# Patient Record
Sex: FEMALE | Race: WHITE | NOT HISPANIC OR LATINO | Employment: FULL TIME | URBAN - METROPOLITAN AREA
[De-identification: names, ages, dates, MRNs, and addresses within clinical notes are randomized per-mention and may not be internally consistent; named-entity substitution may affect disease eponyms.]

---

## 2024-05-02 ENCOUNTER — HOSPITAL ENCOUNTER (OUTPATIENT)
Dept: RADIOLOGY | Facility: MEDICAL CENTER | Age: 44
End: 2024-05-02
Attending: NURSE PRACTITIONER
Payer: COMMERCIAL

## 2024-05-02 ENCOUNTER — OFFICE VISIT (OUTPATIENT)
Dept: URGENT CARE | Facility: CLINIC | Age: 44
End: 2024-05-02
Payer: COMMERCIAL

## 2024-05-02 VITALS
DIASTOLIC BLOOD PRESSURE: 62 MMHG | BODY MASS INDEX: 21.18 KG/M2 | RESPIRATION RATE: 16 BRPM | HEIGHT: 69 IN | TEMPERATURE: 97.1 F | OXYGEN SATURATION: 99 % | HEART RATE: 82 BPM | WEIGHT: 143 LBS | SYSTOLIC BLOOD PRESSURE: 108 MMHG

## 2024-05-02 DIAGNOSIS — I80.02 THROMBOPHLEBITIS OF SUPERFICIAL VEINS OF LEFT LOWER EXTREMITY: ICD-10-CM

## 2024-05-02 DIAGNOSIS — M79.89 LEFT LEG SWELLING: ICD-10-CM

## 2024-05-02 DIAGNOSIS — S49.92XA INJURY OF LEFT SHOULDER, INITIAL ENCOUNTER: ICD-10-CM

## 2024-05-02 PROCEDURE — 3074F SYST BP LT 130 MM HG: CPT | Performed by: NURSE PRACTITIONER

## 2024-05-02 PROCEDURE — 3078F DIAST BP <80 MM HG: CPT | Performed by: NURSE PRACTITIONER

## 2024-05-02 PROCEDURE — 99203 OFFICE O/P NEW LOW 30 MIN: CPT | Performed by: NURSE PRACTITIONER

## 2024-05-02 NOTE — PROGRESS NOTES
Chief Complaint   Patient presents with    Shoulder Injury     Shoulder/ rotator cuff, needs referal for MRI     Other     PT states veins on her leg have turned into a bruise, wants to make sure there is no blood clots        HISTORY OF PRESENT ILLNESS: Patient is a pleasant 43 y.o. female who presents to urgent care today with complaints of left shoulder pain and left lower extremity pain.  Patient notes she accidentally injured her shoulder approximately 6 months ago.  She has had pain to her left shoulder since.  Pain exacerbated with range of motion.  She denies any numbness or tingling.  Denies previous injuries to the shoulder.  She has tried topical cream for symptom relief.  She is requesting a referral to follow-up with orthopedics.  She also notes history of varicose veins and over the last week has had some tenderness and swelling to varicose veins on left lower extremity.  She denies history of DVTs.      Patient Active Problem List    Diagnosis Date Noted    Appendicitis with perforation 09/23/2015       Allergies:Patient has no known allergies.    Current Outpatient Medications Ordered in Epic   Medication Sig Dispense Refill    POTASSIUM PO Take 1 Tab by mouth every bedtime. OTC      MAGNESIUM PO Take 1 Tab by mouth every bedtime. OTC      TURMERIC PO Take 1 Tab by mouth every day.      ibuprofen (MOTRIN) 200 MG Tab Take 600 mg by mouth every 6 hours as needed.       No current Epic-ordered facility-administered medications on file.       History reviewed. No pertinent past medical history.    Social History     Tobacco Use    Smoking status: Never   Substance Use Topics    Alcohol use: No    Drug use: No       No family status information on file.   History reviewed. No pertinent family history.    ROS:  Review of Systems   Constitutional: Negative for fever, chills, weight loss, malaise, and fatigue.   HENT: Negative for ear pain, nosebleeds, congestion, sore throat and neck pain.    Eyes: Negative  "for vision changes.   Neuro: Negative for headache, sensory changes, weakness, seizure, LOC.   Cardiovascular: Negative for chest pain, palpitations, orthopnea and leg swelling.   Respiratory: Negative for cough, sputum production, shortness of breath and wheezing.   Gastrointestinal: Negative for abdominal pain, nausea, vomiting or diarrhea.   Genitourinary: Negative for dysuria, urgency and frequency.  Musculoskeletal: Positive for left lower extremity pain and swelling.  Positive for left shoulder pain.  Negative for falls, neck pain, back pain, myalgias.   Skin: Negative for rash, diaphoresis.     Exam:  /62   Pulse 82   Temp 36.2 °C (97.1 °F)   Resp 16   Ht 1.753 m (5' 9\")   Wt 64.9 kg (143 lb)   SpO2 99%   General: well-nourished, well-developed female in NAD  Head: normocephalic, atraumatic  Eyes: PERRLA, no conjunctival injection, acuity grossly intact, lids normal.  Ears: normal shape and symmetry, no tenderness, no discharge. External canals are without any significant edema or erythema. Tympanic membranes are without any inflammation, no effusion. Gross auditory acuity is intact.  Nose: symmetrical without tenderness, no discharge.  Mouth/Throat: reasonable hygiene, no erythema, exudates or tonsillar enlargement.  Neck: no masses, range of motion within normal limits, no tracheal deviation. No obvious thyroid enlargement.   Lymph: no cervical adenopathy. No supraclavicular adenopathy.   Neuro: alert and oriented. Cranial nerves 1-12 grossly intact. No sensory deficit.   Cardiovascular: regular rate and rhythm. No edema.  Pulmonary: no distress. Chest is symmetrical with respiration, no wheezes, crackles, or rhonchi.   Musculoskeletal: no clubbing, appropriate muscle tone, gait is stable.  Left shoulder: Normal appearance, nontender, full range of motion.  Left lower extremity: There is tenderness and swelling noted to medial left knee.  Varicose veins noted to bilateral lower " extremities.  Skin: warm, dry, intact, no clubbing, no cyanosis, no rashes.   Psych: appropriate mood, affect, judgement.         Assessment/Plan:  1. Left leg swelling  US-EXTREMITY VENOUS LOWER UNILAT LEFT      2. Injury of left shoulder, initial encounter  Referral to Orthopedics          The patient is a pleasant 43-year-old female who presents with left shoulder injury approximately 6 months ago.  I placed a referral for her to follow-up with orthopedics.  She may try OTC NSAIDs for symptom relief.  Regarding left leg pain and swelling, ultrasound ordered, will follow-up accordingly.  Supportive care, differential diagnoses, and indications for immediate follow-up discussed with patient.   Pathogenesis of diagnosis discussed including typical length and natural progression.   Instructed to return to clinic or nearest emergency department for any change in condition, further concerns, or worsening of symptoms.  Patient states understanding of the plan of care and discharge instructions.  Instructed to make an appointment, for follow up, with her primary care provider.        Please note that this dictation was created using voice recognition software. I have made every reasonable attempt to correct obvious errors, but I expect that there are errors of grammar and possibly content that I did not discover before finalizing the note.      NATY Yang.

## 2024-05-08 ENCOUNTER — TELEPHONE (OUTPATIENT)
Dept: VASCULAR LAB | Facility: MEDICAL CENTER | Age: 44
End: 2024-05-08
Payer: COMMERCIAL

## 2024-05-08 NOTE — TELEPHONE ENCOUNTER
[facility] Heart and Vascular Health and Pharmacotherapy Programs     Received anticoagulation referral from TOÑO Wu on 5/2/24.     Called patient to schedule an appt to establish care. No answer. Unable to LVM.    1st call     Insurance: [facility]  PCP: None  Locations to be seen: [facility]     If no response by 6/2/24 (1 month from referral date) OR 2 no shows/cancellations, will remove from referral list and send FYI to referring provider    [facility]armacotherapy [facility] at [phone], fax [phone]    Servando Yu, PharmD, BCACP

## 2024-05-09 ENCOUNTER — ANTICOAGULATION VISIT (OUTPATIENT)
Dept: VASCULAR LAB | Facility: MEDICAL CENTER | Age: 44
End: 2024-05-09
Attending: INTERNAL MEDICINE
Payer: COMMERCIAL

## 2024-05-09 VITALS — DIASTOLIC BLOOD PRESSURE: 51 MMHG | SYSTOLIC BLOOD PRESSURE: 96 MMHG | HEART RATE: 85 BPM

## 2024-05-09 DIAGNOSIS — I80.02 THROMBOPHLEBITIS OF SUPERFICIAL VEINS OF LEFT LOWER EXTREMITY: ICD-10-CM

## 2024-05-09 PROBLEM — I80.00 SUPERFICIAL THROMBOPHLEBITIS OF LOWER EXTREMITY: Status: ACTIVE | Noted: 2024-05-09

## 2024-05-09 PROCEDURE — 3078F DIAST BP <80 MM HG: CPT | Performed by: NURSE PRACTITIONER

## 2024-05-09 PROCEDURE — 99211 OFF/OP EST MAY X REQ PHY/QHP: CPT | Performed by: NURSE PRACTITIONER

## 2024-05-09 PROCEDURE — 3074F SYST BP LT 130 MM HG: CPT | Performed by: NURSE PRACTITIONER

## 2024-05-09 NOTE — PROGRESS NOTES
NEW DOAC   .  Anticoagulation Patient Findings      PCP: Pcp Pt States None  Cardiologist: none  Dx: LLE SVT  CHADSVASC = n/a  HAS-BLED = 0  Target End Date = 6 weeks then re-eval    Pt Hx: Pt seen at  5/2/24 w/ left shoulder pain and LLE pain. She has a hx of varicose veins and noted more pain and swelling the week prior.     U/s showed:    A varicose vein at the knee is dilated, thrombosed & filled with material    of soft acoustic density consistent with acute superficial    thrombophlebitis.  No obious extension into the greater saphenous vein.    Denies any prior hx of blood clots. No known FH. She does not use tobacco. Has never been pregnant and does not take hormones. She was started on Xarelto 10 mg daily. Taking once daily as instructed. No problems with bleeding.    Labs:  Lab Results   Component Value Date/Time    WBC 14.6 (H) 09/23/2015 12:37 PM    RBC 4.49 09/23/2015 12:37 PM    HEMOGLOBIN 14.3 09/23/2015 12:37 PM    HEMATOCRIT 42.6 09/23/2015 12:37 PM    MCV 94.9 09/23/2015 12:37 PM    MCH 31.8 09/23/2015 12:37 PM    MCHC 33.6 09/23/2015 12:37 PM    MPV 10.2 09/23/2015 12:37 PM    NEUTSPOLYS 58.40 09/23/2015 12:37 PM    LYMPHOCYTES 10.60 (L) 09/23/2015 12:37 PM    MONOCYTES 3.50 09/23/2015 12:37 PM    EOSINOPHILS 0.90 09/23/2015 12:37 PM    BASOPHILS 0.90 09/23/2015 12:37 PM    HYPOCHROMIA 1+ 06/14/2014 05:20 PM    ANISOCYTOSIS 1+ 09/23/2015 12:37 PM      Lab Results   Component Value Date/Time    SODIUM 136 09/23/2015 12:37 PM    POTASSIUM 3.2 (L) 09/23/2015 12:37 PM    CHLORIDE 103 09/23/2015 12:37 PM    CO2 23 09/23/2015 12:37 PM    GLUCOSE 120 (H) 09/23/2015 12:37 PM    BUN 22 09/23/2015 12:37 PM    CREATININE 0.91 09/23/2015 12:37 PM        Pt is new to rivaroxaban (Xarelto) and new to RCC. Discussed:   Indication for DOAC therapy.  Importance of monitoring and compliance.   Monitoring parameters, signs and symptoms of bleeding or clotting.  DOAC therapy, side effects, potential DDIs, OTC  medications  Hormonal therapy: No  Pregnancy: No  DDI: No  Pt is not on antiplatelet/NSAID therapy  Lifestyle safety, ie smoking, ETOH, hobby safety, fall safety/prevention  Procedures for missed doses or suspected missed doses, surgeries/procedures, travel, dental work, any medication changes    Continue taking Xarelto 10 mg by mouth daily    DOAC affordable: yes; copay ~$40 per month    Samples provided: no;     Labs to be completed prior to next f/u - CBC, CMP  Referral for PCP placed.     F/U:  5 week(s)    DELORIS Zamorano.    Added [facility] to care team   Send to [facility]

## 2024-05-28 ENCOUNTER — TELEPHONE (OUTPATIENT)
Dept: VASCULAR LAB | Facility: MEDICAL CENTER | Age: 44
End: 2024-05-28
Payer: COMMERCIAL

## 2024-05-28 DIAGNOSIS — I80.02 THROMBOPHLEBITIS OF SUPERFICIAL VEINS OF LEFT LOWER EXTREMITY: ICD-10-CM

## 2024-05-28 NOTE — TELEPHONE ENCOUNTER
Called and spoke to Pt today in regards to setting up her refill with [facility]. Per pt, she states that she would like to get her medication to be filled through her current pharmacy due to convenience on her part. Provided my contact information and my co-Rx coordinator if in case she has questions and concerns.     Verified and confirmed with pt that she haven't enrolled with copay card for the Xarelto. Provided her the website to enroll with and follow the prompts to generate a copay card. Pt verbally understood and thanked me for the help.     Will release Rx to pharmacy on file: [id number]--[address], [address], [place] [address]      MARINA Brush, PhT  Vascular Pharmacy Liaison (Rx Coordinator)  P: [phone]  5/28/2024 3:50 PM

## 2024-05-28 NOTE — TELEPHONE ENCOUNTER
Received Refill PA request via [employer]  for XARELTO 10MG TABLETS. (Quantity:90, Day Supply:90)     Insurance: [employer]  Member ID:  [id number]  BIN: [id number]  PCN: [facility]  Group: [id number]     Ran Test claim via [place] & medication Pays for a $120/90DS ; $40/30DS copay. Will outreach to patient to offer specialty pharmacy services and or release to preferred pharmacy    MARINA Brush, PhT  Vascular Pharmacy Liaison (Rx Coordinator)  P: [phone]  5/28/2024 3:26 PM

## 2024-06-04 SDOH — ECONOMIC STABILITY: FOOD INSECURITY: WITHIN THE PAST 12 MONTHS, YOU WORRIED THAT YOUR FOOD WOULD RUN OUT BEFORE YOU GOT MONEY TO BUY MORE.: NEVER TRUE

## 2024-06-04 SDOH — ECONOMIC STABILITY: TRANSPORTATION INSECURITY
IN THE PAST 12 MONTHS, HAS THE LACK OF TRANSPORTATION KEPT YOU FROM MEDICAL APPOINTMENTS OR FROM GETTING MEDICATIONS?: NO

## 2024-06-04 SDOH — ECONOMIC STABILITY: HOUSING INSECURITY
IN THE LAST 12 MONTHS, WAS THERE A TIME WHEN YOU DID NOT HAVE A STEADY PLACE TO SLEEP OR SLEPT IN A SHELTER (INCLUDING NOW)?: NO

## 2024-06-04 SDOH — ECONOMIC STABILITY: INCOME INSECURITY: HOW HARD IS IT FOR YOU TO PAY FOR THE VERY BASICS LIKE FOOD, HOUSING, MEDICAL CARE, AND HEATING?: NOT VERY HARD

## 2024-06-04 SDOH — ECONOMIC STABILITY: HOUSING INSECURITY: IN THE LAST 12 MONTHS, HOW MANY PLACES HAVE YOU LIVED?: 1

## 2024-06-04 SDOH — ECONOMIC STABILITY: FOOD INSECURITY: WITHIN THE PAST 12 MONTHS, THE FOOD YOU BOUGHT JUST DIDN'T LAST AND YOU DIDN'T HAVE MONEY TO GET MORE.: NEVER TRUE

## 2024-06-04 SDOH — ECONOMIC STABILITY: TRANSPORTATION INSECURITY
IN THE PAST 12 MONTHS, HAS LACK OF RELIABLE TRANSPORTATION KEPT YOU FROM MEDICAL APPOINTMENTS, MEETINGS, WORK OR FROM GETTING THINGS NEEDED FOR DAILY LIVING?: NO

## 2024-06-04 SDOH — HEALTH STABILITY: PHYSICAL HEALTH: ON AVERAGE, HOW MANY DAYS PER WEEK DO YOU ENGAGE IN MODERATE TO STRENUOUS EXERCISE (LIKE A BRISK WALK)?: 7 DAYS

## 2024-06-04 SDOH — ECONOMIC STABILITY: TRANSPORTATION INSECURITY
IN THE PAST 12 MONTHS, HAS LACK OF TRANSPORTATION KEPT YOU FROM MEETINGS, WORK, OR FROM GETTING THINGS NEEDED FOR DAILY LIVING?: NO

## 2024-06-04 SDOH — ECONOMIC STABILITY: INCOME INSECURITY: IN THE LAST 12 MONTHS, WAS THERE A TIME WHEN YOU WERE NOT ABLE TO PAY THE MORTGAGE OR RENT ON TIME?: NO

## 2024-06-04 SDOH — HEALTH STABILITY: MENTAL HEALTH
STRESS IS WHEN SOMEONE FEELS TENSE, NERVOUS, ANXIOUS, OR CAN'T SLEEP AT NIGHT BECAUSE THEIR MIND IS TROUBLED. HOW STRESSED ARE YOU?: RATHER MUCH

## 2024-06-04 SDOH — HEALTH STABILITY: PHYSICAL HEALTH: ON AVERAGE, HOW MANY MINUTES DO YOU ENGAGE IN EXERCISE AT THIS LEVEL?: 30 MIN

## 2024-06-04 ASSESSMENT — SOCIAL DETERMINANTS OF HEALTH (SDOH)
HOW OFTEN DO YOU GET TOGETHER WITH FRIENDS OR RELATIVES?: ONCE A WEEK
DO YOU BELONG TO ANY CLUBS OR ORGANIZATIONS SUCH AS CHURCH GROUPS UNIONS, FRATERNAL OR ATHLETIC GROUPS, OR SCHOOL GROUPS?: NO
IN A TYPICAL WEEK, HOW MANY TIMES DO YOU TALK ON THE PHONE WITH FAMILY, FRIENDS, OR NEIGHBORS?: MORE THAN THREE TIMES A WEEK
HOW OFTEN DO YOU ATTENT MEETINGS OF THE CLUB OR ORGANIZATION YOU BELONG TO?: NEVER
HOW OFTEN DO YOU HAVE A DRINK CONTAINING ALCOHOL: MONTHLY OR LESS
HOW OFTEN DO YOU HAVE SIX OR MORE DRINKS ON ONE OCCASION: NEVER
WITHIN THE PAST 12 MONTHS, YOU WORRIED THAT YOUR FOOD WOULD RUN OUT BEFORE YOU GOT THE MONEY TO BUY MORE: NEVER TRUE
HOW HARD IS IT FOR YOU TO PAY FOR THE VERY BASICS LIKE FOOD, HOUSING, MEDICAL CARE, AND HEATING?: NOT VERY HARD
HOW MANY DRINKS CONTAINING ALCOHOL DO YOU HAVE ON A TYPICAL DAY WHEN YOU ARE DRINKING: 1 OR 2
HOW OFTEN DO YOU ATTEND CHURCH OR RELIGIOUS SERVICES?: NEVER
HOW OFTEN DO YOU GET TOGETHER WITH FRIENDS OR RELATIVES?: ONCE A WEEK
HOW OFTEN DO YOU ATTENT MEETINGS OF THE CLUB OR ORGANIZATION YOU BELONG TO?: NEVER
DO YOU BELONG TO ANY CLUBS OR ORGANIZATIONS SUCH AS CHURCH GROUPS UNIONS, FRATERNAL OR ATHLETIC GROUPS, OR SCHOOL GROUPS?: NO
HOW OFTEN DO YOU ATTEND CHURCH OR RELIGIOUS SERVICES?: NEVER
IN A TYPICAL WEEK, HOW MANY TIMES DO YOU TALK ON THE PHONE WITH FAMILY, FRIENDS, OR NEIGHBORS?: MORE THAN THREE TIMES A WEEK

## 2024-06-04 ASSESSMENT — LIFESTYLE VARIABLES
HOW OFTEN DO YOU HAVE A DRINK CONTAINING ALCOHOL: MONTHLY OR LESS
SKIP TO QUESTIONS 9-10: 1
HOW OFTEN DO YOU HAVE SIX OR MORE DRINKS ON ONE OCCASION: NEVER
AUDIT-C TOTAL SCORE: 1
HOW MANY STANDARD DRINKS CONTAINING ALCOHOL DO YOU HAVE ON A TYPICAL DAY: 1 OR 2

## 2024-06-07 ENCOUNTER — OFFICE VISIT (OUTPATIENT)
Dept: MEDICAL GROUP | Facility: PHYSICIAN GROUP | Age: 44
End: 2024-06-07
Payer: COMMERCIAL

## 2024-06-07 VITALS
HEIGHT: 69 IN | DIASTOLIC BLOOD PRESSURE: 70 MMHG | WEIGHT: 142 LBS | BODY MASS INDEX: 21.03 KG/M2 | SYSTOLIC BLOOD PRESSURE: 116 MMHG | HEART RATE: 82 BPM | TEMPERATURE: 98 F | OXYGEN SATURATION: 98 %

## 2024-06-07 DIAGNOSIS — Z11.4 SCREENING FOR HIV (HUMAN IMMUNODEFICIENCY VIRUS): ICD-10-CM

## 2024-06-07 DIAGNOSIS — Z11.59 NEED FOR HEPATITIS C SCREENING TEST: ICD-10-CM

## 2024-06-07 DIAGNOSIS — Z12.11 ENCOUNTER FOR SCREENING FOR COLORECTAL MALIGNANT NEOPLASM: ICD-10-CM

## 2024-06-07 DIAGNOSIS — M25.512 CHRONIC LEFT SHOULDER PAIN: ICD-10-CM

## 2024-06-07 DIAGNOSIS — Z23 NEED FOR VACCINATION: ICD-10-CM

## 2024-06-07 DIAGNOSIS — Z00.00 PREVENTATIVE HEALTH CARE: ICD-10-CM

## 2024-06-07 DIAGNOSIS — G89.29 CHRONIC LEFT SHOULDER PAIN: ICD-10-CM

## 2024-06-07 DIAGNOSIS — Z12.12 ENCOUNTER FOR SCREENING FOR COLORECTAL MALIGNANT NEOPLASM: ICD-10-CM

## 2024-06-07 PROBLEM — I83.93 ASYMPTOMATIC VARICOSE VEINS OF LOWER EXTREMITY WITHOUT COMPLICATION, BILATERAL: Status: ACTIVE | Noted: 2024-06-07

## 2024-06-07 PROBLEM — G25.81 RESTLESS LEG SYNDROME: Status: ACTIVE | Noted: 2024-06-07

## 2024-06-07 PROCEDURE — 3074F SYST BP LT 130 MM HG: CPT | Performed by: STUDENT IN AN ORGANIZED HEALTH CARE EDUCATION/TRAINING PROGRAM

## 2024-06-07 PROCEDURE — 90715 TDAP VACCINE 7 YRS/> IM: CPT | Performed by: STUDENT IN AN ORGANIZED HEALTH CARE EDUCATION/TRAINING PROGRAM

## 2024-06-07 PROCEDURE — 90471 IMMUNIZATION ADMIN: CPT | Performed by: STUDENT IN AN ORGANIZED HEALTH CARE EDUCATION/TRAINING PROGRAM

## 2024-06-07 PROCEDURE — 3078F DIAST BP <80 MM HG: CPT | Performed by: STUDENT IN AN ORGANIZED HEALTH CARE EDUCATION/TRAINING PROGRAM

## 2024-06-07 PROCEDURE — 99213 OFFICE O/P EST LOW 20 MIN: CPT | Mod: 25 | Performed by: STUDENT IN AN ORGANIZED HEALTH CARE EDUCATION/TRAINING PROGRAM

## 2024-06-07 ASSESSMENT — ENCOUNTER SYMPTOMS
SHORTNESS OF BREATH: 0
FEVER: 0
HEADACHES: 0
DIZZINESS: 0
CHILLS: 0

## 2024-06-07 ASSESSMENT — PATIENT HEALTH QUESTIONNAIRE - PHQ9: CLINICAL INTERPRETATION OF PHQ2 SCORE: 0

## 2024-06-07 NOTE — PROGRESS NOTES
Verbal consent was acquired by the patient to use Synap[employer]ense ambient listening note generation during this visit.    Subjective:     HPI:   History of Present Illness  The patient is a 43-year-old female who presents to establish care and to discuss shoulder pain.      The patient has no chronic medical conditions.  She states that she has never been established with primary care.    She is currently on Xarelto 10 mg daily for 6 weeks for superficial thrombophlebitis.  She is following with the anticoagulation clinic for management of her Xarelto.  She does have varicose veins in both legs that are generally asymptomatic.    She reports a history of restless leg syndrome.  Symptoms only bother her at night.  She generally takes potassium and magnesium supplements before bed and her symptoms are well-controlled with this.    The patient has concerns about left shoulder pain. Approximately 8 months ago, the patient experienced pain and a a popping sensation in her left shoulder while she was reaching over in bed to try to pull her large dog closer to her.  She has had pain since that event and symptoms have progressively gotten worse.  She has pain and difficulty lifting her arm above approximately 75 degrees.  Her pain starts in the shoulder posteriorly and wraps around to the front.  She thought that her pain would slowly improve but it has not.  She has not been evaluated for this previously.        Patient History:    History reviewed. No pertinent past medical history.    Past Surgical History:   Procedure Laterality Date    APPENDECTOMY LAPAROSCOPIC  09/23/2015    Procedure: APPENDECTOMY LAPAROSCOPIC;  Surgeon: Juany Mast M.D.;  Location: SURGERY [facility];  Service:         reports that she has never smoked. She has never been exposed to tobacco smoke. She does not have any smokeless tobacco history on file. She reports that she does not drink alcohol and does not use drugs.    Family History  "  Problem Relation Age of Onset    Cancer Neg Hx     Ovarian Cancer Neg Hx     Peritoneal Cancer Neg Hx     Tubal Cancer Neg Hx     Breast Cancer Neg Hx     Colorectal Cancer Neg Hx         Health Maintenance: Completed    ROS:  Review of Systems   Constitutional:  Negative for chills and fever.   Respiratory:  Negative for shortness of breath.    Cardiovascular:  Negative for chest pain.   Neurological:  Negative for dizziness and headaches.       Objective:     Exam:  /70 (BP Location: Left arm, Patient Position: Sitting, BP Cuff Size: Adult)   Pulse 82   Temp 36.7 °C (98 °F) (Temporal)   Ht 1.753 m (5' 9\")   Wt 64.4 kg (142 lb)   SpO2 98%   BMI 20.97 kg/m²  Body mass index is 20.97 kg/m².    Physical Exam  Vitals reviewed.   Constitutional:       General: She is not in acute distress.  Eyes:      Extraocular Movements: Extraocular movements intact.   Cardiovascular:      Rate and Rhythm: Normal rate and regular rhythm.      Heart sounds: No murmur heard.     No friction rub. No gallop.   Pulmonary:      Effort: Pulmonary effort is normal.      Breath sounds: No wheezing, rhonchi or rales.   Musculoskeletal:      Cervical back: Neck supple.      Comments: Left Shoulder: Range of motion is limited full with abduction, flexion, extension, and both internal and external rotation.  Patient unable to lift arm in flexion or abduction above approximately 75 degrees.  There is no specific tenderness to palpation.   Skin:     General: Skin is warm and dry.   Neurological:      Mental Status: She is alert.   Psychiatric:         Mood and Affect: Mood normal.           Results      Assessment & Plan:     1. Chronic left shoulder pain  MR-SHOULDER-W/O LEFT    Referral to Orthopedics      2. Preventative health care  Comp Metabolic Panel    Lipid Profile    HEMOGLOBIN A1C      3. Screening for HIV (human immunodeficiency virus)  HIV AG/AB COMBO ASSAY SCREENING      4. Need for hepatitis C screening test  HEP C " VIRUS ANTIBODY      5. Need for vaccination  Tdap =>8yo IM      6. Encounter for screening for colorectal malignant neoplasm  COLOGUARD (FIT DNA)          Assessment & Plan  2. Left shoulder pain.  This has been ongoing for about 9 months after an injury to the shoulder.  I am concerned for a rotator cuff tear given her range of motion limitations.  An MRI of the shoulder has been ordered.  And referral to orthopedics for follow-up has been made.      1. Health maintenance.  Routine labs have been ordered.    HIV and hep C screenings ordered.    Tdap will be updated today.  A Cologuard test has been ordered for the patient.   Additionally, a Pap smear will be scheduled during her next visit.  She believes her last Pap was about 7 years ago.        Follow-up  The patient is scheduled for a follow-up visit in 3 to 6 months for an annual exam.          Please note that this dictation was created using voice recognition software. I have made every reasonable attempt to correct obvious errors, but I expect that there are errors of grammar and possibly content that I did not discover before finalizing the note.

## 2024-06-17 ENCOUNTER — HOSPITAL ENCOUNTER (OUTPATIENT)
Dept: RADIOLOGY | Facility: MEDICAL CENTER | Age: 44
End: 2024-06-17
Attending: STUDENT IN AN ORGANIZED HEALTH CARE EDUCATION/TRAINING PROGRAM
Payer: COMMERCIAL

## 2024-06-17 DIAGNOSIS — M25.512 CHRONIC LEFT SHOULDER PAIN: ICD-10-CM

## 2024-06-17 DIAGNOSIS — G89.29 CHRONIC LEFT SHOULDER PAIN: ICD-10-CM

## 2024-06-17 PROCEDURE — 73221 MRI JOINT UPR EXTREM W/O DYE: CPT | Mod: LT

## 2024-06-17 NOTE — PROGRESS NOTES
VASCULAR MEDICINE CLINIC - INITIAL VISIT (ANTICOAGULATION)  06/17/24     Referred for length of therapy (LOT) determination of anticoagulation in context of acute venothromboembolic disease    Subjective    HPI:    VTE disease / Anticoagulation:   Date of initiation of anticoagulation: 5/2/24  Current symptoms:  Denies current SOB, CP, leg swelling, edema, leg pain, cyanosis of digits, redness of extremities.  Current antithrombotic agent: Xarelto 10 mg daily  Complications: none, tolerating well, no bleeding or bruising   Adherence: reports complete, no missed doses      _____Pertinent VTE pmhx:   Date of Diagnosis: 5/2/24  Type of Venous thromboembolic disease (VTE): LLE SVT  Preceding/presenting symptoms: Presented to  with LLE pain. Hx of varicose veins and noted some tenderness and swelling to varicose veins of LLE.  Antithrombotic therapy at time of VTE event: no  VTE tx course: Xarelto 10 mg x 6 weeks  Any personal previous VTE hx? No  Any family VTE hx? Her mother recently diagnosed with a PE thought to be caused by a sedentary lifestyle and tobacco use per pt. She is currently on Eliquis. No other known FH.    _____UNPROVOKED VS PROVOKED:   Recent surgery ? No  Recent trauma ? No but she was bit by a wasp to her left inner thigh/knee region about 1 week prior. The bite was the same location where she began to feel tenderness and swelling which progressively worsened over the next week.  Smoker?  reports that she has never smoked. She has never been exposed to tobacco smoke. She does not have any smokeless tobacco history on file.    Extended travel? No  Other periods of immobility? No  Other known or potential risk factors for VTE disease:  no  MEN:  Hx of cancer or abnormal cancer screenings: N\A  Hx of Testosterone therapy: N\A  WOMEN: Hx of cancer or abnormal cancer screenings: no. She is due for mammogram and pap smear; will discuss with her PCP       Any estrogen, testosterone HRT, E2 birth control,  tamoxifene, raloxifene: no       Hx of recurrent miscarriages: no  Hypercoaguability work-up completed?  no (see assessment for details)     Patient Active Problem List   Diagnosis    Appendicitis with perforation    Superficial thrombophlebitis of lower extremity    Restless leg syndrome    Asymptomatic varicose veins of lower extremity without complication, bilateral      Past Surgical History:   Procedure Laterality Date    APPENDECTOMY LAPAROSCOPIC  09/23/2015    Procedure: APPENDECTOMY LAPAROSCOPIC;  Surgeon: Juany Mast M.D.;  Location: SURGERY [facility];  Service:       Family History   Problem Relation Age of Onset    Cancer Neg Hx     Ovarian Cancer Neg Hx     Peritoneal Cancer Neg Hx     Tubal Cancer Neg Hx     Breast Cancer Neg Hx     Colorectal Cancer Neg Hx       Social History     Tobacco Use    Smoking status: Never     Passive exposure: Never   Vaping Use    Vaping status: Never Used   Substance Use Topics    Alcohol use: No    Drug use: No       DIET AND EXERCISE:  Weight Change:stable  Diet: common adult  Exercise: moderate regular exercise program     Current Outpatient Medications on File Prior to Visit   Medication Sig Dispense Refill    POTASSIUM PO Take 1 Tab by mouth every bedtime. OTC      MAGNESIUM PO Take 1 Tab by mouth every bedtime. OTC      TURMERIC PO Take 1 Tab by mouth every day.       No current facility-administered medications on file prior to visit.     Patient has no known allergies.  Review of Systems   HENT:  Negative for nosebleeds.    Respiratory:  Negative for hemoptysis and shortness of breath.    Cardiovascular:  Negative for chest pain, claudication and leg swelling.   Gastrointestinal:  Negative for blood in stool and melena.   Musculoskeletal:  Positive for joint pain. Negative for falls and myalgias.        Left shoulder pain   Neurological:  Negative for seizures and loss of consciousness.   Endo/Heme/Allergies:  Does not bruise/bleed easily.           "  Objective       Objective:     Vitals:    24 1034   BP: 109/69   Pulse: 71        Physical Exam  Constitutional:       Appearance: Normal appearance. She is normal weight.   Cardiovascular:      Rate and Rhythm: Normal rate and regular rhythm.      Heart sounds: Normal heart sounds.   Pulmonary:      Effort: Pulmonary effort is normal.      Breath sounds: Normal breath sounds.   Musculoskeletal:         General: No swelling or tenderness.      Right lower leg: No edema.      Left lower leg: No edema.      Comments: Scattered varicosities noted to bilateral lower extremities.    Skin:     General: Skin is warm and dry.      Comments: No discoloration, ulcers or lesions to blue LEs.   Neurological:      General: No focal deficit present.      Mental Status: She is alert.   Psychiatric:         Mood and Affect: Mood normal.         Behavior: Behavior normal.         Thought Content: Thought content normal.         Judgment: Judgment normal.          DATA REVIEW    No results found for: \"CHOLSTRLTOT\", \"LDL\", \"HDL\", \"TRIGLYCERIDE\"    Lab Results   Component Value Date/Time    SODIUM 136 2015 12:37 PM    POTASSIUM 3.2 (L) 2015 12:37 PM    CHLORIDE 103 2015 12:37 PM    CO2 23 2015 12:37 PM    GLUCOSE 120 (H) 2015 12:37 PM    BUN 22 2015 12:37 PM    CREATININE 0.91 2015 12:37 PM     Lab Results   Component Value Date/Time    ALKPHOSPHAT 63 2015 12:37 PM    ASTSGOT 21 2015 12:37 PM    ALTSGPT 33 2015 12:37 PM    TBILIRUBIN 0.7 2015 12:37 PM       INR   Date Value Ref Range Status   2015 1.06 0.87 - 1.13 Final     Comment:     INR - Non-therapeutic Reference Range: 0.87-1.13  INR - Therapeutic Reference Range: 2.0-4.0       No results found for: \"POCINR\"     Pertinent Cardiovascular Imagin/2/24 LLE venous duplex:   LEFT LOWER EXTREMITY:   A varicose vein at the knee is dilated, thrombosed & filled with material    of soft acoustic density " consistent with acute superficial    thrombophlebitis.  No obious extension into the greater saphenous vein.   All other veins demonstrate complete color filling and compressibility with    normal venous flow dynamics including spontaneous flow and respiratory    phasicity.    No deep venous thrombosis.     Medical Decision Making:  Today's Assessment / Status / Plan:     1. Thrombophlebitis of superficial veins of left lower extremity             1) VTE disease: LLE SVT  Thrombophilia/hypercoag evaluation:  no  PLAN  - no imaging surveillance needed at this time  - antithrombotic plan as noted below   - counseled on signs and symptoms of acute VTE that require seeking prompt attention in the ED including shortness of breath, chest pain, pain with deep inhalation, acute leg swelling and/or pain in calf or leg   - elevate legs as much as possible, use compression stockings/socks if sitting or standing for prolonged periods (knee high 20-33 mmHg)  - avoid hormonal therapies containing E2 or testosterone, raloxifene, tamoxifene, and other meds increasing risk for VTE   - avoid or limit sedentary periods  - continue complete avoidance of tobacco products  - if having any invasive procedure,please make sure the doctor knows of your history of blood clots and current anticoagulation status  - recommend f/u with PCP for age-appropriate cancer-screening due to risk of malig-associated VTE    ANTITHROMBOTIC THERAPY  Date of initiation: 5/2/24  HAS-BLED bleeding risk calc ([email or web address]): 0 pts, 0.9%, low risk   Factors to consider for indefinite OAC: N/A  Last CBC, BMP: reviewed above   Expected duration: She has completed 6 weeks of Xarelto 10 mg.  After review of risks/benefits/alternatives, through shared decision-making, we will stop Xarelto. She is fully asymptomatic. Recommend daytime compression stocking use as she does have varicose veins. Stressed the importance of close surveillance for s/sx of SVT or VTE which we  chris.    PLAN:  - stop Xarelto    LIFESTYLE INTERVENTIONS  TOBACCO:    reports that she has never smoked. She has never been exposed to tobacco smoke. She does not have any smokeless tobacco history on file.   - continued complete avoidance of all tobacco products   PHYSICAL ACTIVITY: >150min/week of mod-intensity activity or as much as tolerated    Studies to Be Obtained: none  Labs to Be Obtained: none    Follow up in vascular PRN.    Bekah LUNDBERG      Cc:  KENZIE Farias PA-C

## 2024-06-19 ENCOUNTER — ANTICOAGULATION VISIT (OUTPATIENT)
Dept: VASCULAR LAB | Facility: MEDICAL CENTER | Age: 44
End: 2024-06-19
Attending: INTERNAL MEDICINE
Payer: COMMERCIAL

## 2024-06-19 VITALS — HEART RATE: 71 BPM | DIASTOLIC BLOOD PRESSURE: 69 MMHG | SYSTOLIC BLOOD PRESSURE: 109 MMHG

## 2024-06-19 DIAGNOSIS — I80.02 THROMBOPHLEBITIS OF SUPERFICIAL VEINS OF LEFT LOWER EXTREMITY: ICD-10-CM

## 2024-06-19 PROBLEM — I80.00 SUPERFICIAL THROMBOPHLEBITIS OF LOWER EXTREMITY: Status: RESOLVED | Noted: 2024-05-09 | Resolved: 2024-06-19

## 2024-06-19 PROCEDURE — 99213 OFFICE O/P EST LOW 20 MIN: CPT | Performed by: NURSE PRACTITIONER

## 2024-06-19 PROCEDURE — 99212 OFFICE O/P EST SF 10 MIN: CPT | Performed by: NURSE PRACTITIONER

## 2024-06-19 PROCEDURE — 3078F DIAST BP <80 MM HG: CPT | Performed by: NURSE PRACTITIONER

## 2024-06-19 PROCEDURE — 3074F SYST BP LT 130 MM HG: CPT | Performed by: NURSE PRACTITIONER

## 2024-06-19 ASSESSMENT — ENCOUNTER SYMPTOMS
MYALGIAS: 0
SHORTNESS OF BREATH: 0
LOSS OF CONSCIOUSNESS: 0
SEIZURES: 0
CLAUDICATION: 0
FALLS: 0
HEMOPTYSIS: 0
BRUISES/BLEEDS EASILY: 0
BLOOD IN STOOL: 0

## 2024-11-26 ENCOUNTER — APPOINTMENT (OUTPATIENT)
Dept: PHYSICAL THERAPY | Facility: REHABILITATION | Age: 44
End: 2024-11-26
Attending: PHYSICIAN ASSISTANT
Payer: COMMERCIAL

## 2024-12-03 ENCOUNTER — APPOINTMENT (OUTPATIENT)
Dept: PHYSICAL THERAPY | Facility: REHABILITATION | Age: 44
End: 2024-12-03
Attending: PHYSICIAN ASSISTANT
Payer: COMMERCIAL

## 2024-12-08 SDOH — HEALTH STABILITY: MENTAL HEALTH
STRESS IS WHEN SOMEONE FEELS TENSE, NERVOUS, ANXIOUS, OR CAN'T SLEEP AT NIGHT BECAUSE THEIR MIND IS TROUBLED. HOW STRESSED ARE YOU?: TO SOME EXTENT

## 2024-12-08 SDOH — ECONOMIC STABILITY: FOOD INSECURITY: WITHIN THE PAST 12 MONTHS, THE FOOD YOU BOUGHT JUST DIDN'T LAST AND YOU DIDN'T HAVE MONEY TO GET MORE.: NEVER TRUE

## 2024-12-08 SDOH — ECONOMIC STABILITY: FOOD INSECURITY: WITHIN THE PAST 12 MONTHS, YOU WORRIED THAT YOUR FOOD WOULD RUN OUT BEFORE YOU GOT MONEY TO BUY MORE.: NEVER TRUE

## 2024-12-08 SDOH — HEALTH STABILITY: PHYSICAL HEALTH: ON AVERAGE, HOW MANY MINUTES DO YOU ENGAGE IN EXERCISE AT THIS LEVEL?: 40 MIN

## 2024-12-08 SDOH — ECONOMIC STABILITY: INCOME INSECURITY: HOW HARD IS IT FOR YOU TO PAY FOR THE VERY BASICS LIKE FOOD, HOUSING, MEDICAL CARE, AND HEATING?: NOT VERY HARD

## 2024-12-08 SDOH — HEALTH STABILITY: PHYSICAL HEALTH: ON AVERAGE, HOW MANY DAYS PER WEEK DO YOU ENGAGE IN MODERATE TO STRENUOUS EXERCISE (LIKE A BRISK WALK)?: 7 DAYS

## 2024-12-08 SDOH — ECONOMIC STABILITY: INCOME INSECURITY: IN THE LAST 12 MONTHS, WAS THERE A TIME WHEN YOU WERE NOT ABLE TO PAY THE MORTGAGE OR RENT ON TIME?: NO

## 2024-12-08 ASSESSMENT — LIFESTYLE VARIABLES
AUDIT-C TOTAL SCORE: 1
HOW OFTEN DO YOU HAVE SIX OR MORE DRINKS ON ONE OCCASION: NEVER
HOW MANY STANDARD DRINKS CONTAINING ALCOHOL DO YOU HAVE ON A TYPICAL DAY: 1 OR 2
HOW OFTEN DO YOU HAVE A DRINK CONTAINING ALCOHOL: MONTHLY OR LESS
SKIP TO QUESTIONS 9-10: 1

## 2024-12-08 ASSESSMENT — SOCIAL DETERMINANTS OF HEALTH (SDOH)
IN A TYPICAL WEEK, HOW MANY TIMES DO YOU TALK ON THE PHONE WITH FAMILY, FRIENDS, OR NEIGHBORS?: TWICE A WEEK
HOW OFTEN DO YOU ATTEND CHURCH OR RELIGIOUS SERVICES?: NEVER
IN THE PAST 12 MONTHS, HAS THE ELECTRIC, GAS, OIL, OR WATER COMPANY THREATENED TO SHUT OFF SERVICE IN YOUR HOME?: NO
HOW MANY DRINKS CONTAINING ALCOHOL DO YOU HAVE ON A TYPICAL DAY WHEN YOU ARE DRINKING: 1 OR 2
WITHIN THE PAST 12 MONTHS, YOU WORRIED THAT YOUR FOOD WOULD RUN OUT BEFORE YOU GOT THE MONEY TO BUY MORE: NEVER TRUE
HOW OFTEN DO YOU HAVE SIX OR MORE DRINKS ON ONE OCCASION: NEVER
HOW OFTEN DO YOU ATTENT MEETINGS OF THE CLUB OR ORGANIZATION YOU BELONG TO?: NEVER
HOW OFTEN DO YOU GET TOGETHER WITH FRIENDS OR RELATIVES?: ONCE A WEEK
DO YOU BELONG TO ANY CLUBS OR ORGANIZATIONS SUCH AS CHURCH GROUPS UNIONS, FRATERNAL OR ATHLETIC GROUPS, OR SCHOOL GROUPS?: NO
HOW OFTEN DO YOU GET TOGETHER WITH FRIENDS OR RELATIVES?: ONCE A WEEK
HOW OFTEN DO YOU ATTENT MEETINGS OF THE CLUB OR ORGANIZATION YOU BELONG TO?: NEVER
HOW OFTEN DO YOU HAVE A DRINK CONTAINING ALCOHOL: MONTHLY OR LESS
HOW HARD IS IT FOR YOU TO PAY FOR THE VERY BASICS LIKE FOOD, HOUSING, MEDICAL CARE, AND HEATING?: NOT VERY HARD
HOW OFTEN DO YOU ATTEND CHURCH OR RELIGIOUS SERVICES?: NEVER
DO YOU BELONG TO ANY CLUBS OR ORGANIZATIONS SUCH AS CHURCH GROUPS UNIONS, FRATERNAL OR ATHLETIC GROUPS, OR SCHOOL GROUPS?: NO
IN A TYPICAL WEEK, HOW MANY TIMES DO YOU TALK ON THE PHONE WITH FAMILY, FRIENDS, OR NEIGHBORS?: TWICE A WEEK

## 2024-12-09 ENCOUNTER — APPOINTMENT (OUTPATIENT)
Dept: PHYSICAL THERAPY | Facility: REHABILITATION | Age: 44
End: 2024-12-09
Attending: PHYSICIAN ASSISTANT
Payer: COMMERCIAL

## 2024-12-11 ENCOUNTER — APPOINTMENT (OUTPATIENT)
Dept: PHYSICAL THERAPY | Facility: REHABILITATION | Age: 44
End: 2024-12-11
Attending: PHYSICIAN ASSISTANT
Payer: COMMERCIAL

## 2024-12-11 ENCOUNTER — OFFICE VISIT (OUTPATIENT)
Dept: MEDICAL GROUP | Facility: PHYSICIAN GROUP | Age: 44
End: 2024-12-11
Payer: COMMERCIAL

## 2024-12-11 VITALS
HEART RATE: 87 BPM | SYSTOLIC BLOOD PRESSURE: 98 MMHG | TEMPERATURE: 98.3 F | OXYGEN SATURATION: 98 % | DIASTOLIC BLOOD PRESSURE: 62 MMHG | BODY MASS INDEX: 21.62 KG/M2 | WEIGHT: 146 LBS | HEIGHT: 69 IN

## 2024-12-11 DIAGNOSIS — Z12.83 SKIN CANCER SCREENING: ICD-10-CM

## 2024-12-11 DIAGNOSIS — Z12.31 ENCOUNTER FOR SCREENING MAMMOGRAM FOR MALIGNANT NEOPLASM OF BREAST: ICD-10-CM

## 2024-12-11 DIAGNOSIS — Z00.00 WELLNESS EXAMINATION: ICD-10-CM

## 2024-12-11 PROCEDURE — 99396 PREV VISIT EST AGE 40-64: CPT | Performed by: STUDENT IN AN ORGANIZED HEALTH CARE EDUCATION/TRAINING PROGRAM

## 2024-12-11 PROCEDURE — 3074F SYST BP LT 130 MM HG: CPT | Performed by: STUDENT IN AN ORGANIZED HEALTH CARE EDUCATION/TRAINING PROGRAM

## 2024-12-11 PROCEDURE — 3078F DIAST BP <80 MM HG: CPT | Performed by: STUDENT IN AN ORGANIZED HEALTH CARE EDUCATION/TRAINING PROGRAM

## 2024-12-11 NOTE — PROGRESS NOTES
Subjective:     CC:   Chief Complaint   Patient presents with    Annual Exam     No PAP        HPI:   Daisha Morin is a 44 y.o.  female who presents for her annual exam. She is feeling well and denies any complaints.    OBGYN History  Last pap: 7 years ago  History of abnormal pap: No    Healthcare Maintenance  Last mammogram: None  Last colon cancer screening: Cologuard negative 2024  Last labs:  - Cholesterol Screening: ordered   - Diabetes Screening: ordered   Infectious disease screenings:  - HIV Screening: ordered   - Hepatitis C Screening: ordered   Immunizations:  - Tdap: current  - Hep B: declined   - Influenza: declined      She  has a past medical history of Migraine.  She  has a past surgical history that includes appendectomy laparoscopic (2015) and appendectomy.    Family History   Problem Relation Age of Onset    Cancer Neg Hx     Ovarian Cancer Neg Hx     Peritoneal Cancer Neg Hx     Tubal Cancer Neg Hx     Breast Cancer Neg Hx     Colorectal Cancer Neg Hx        Social History     Socioeconomic History    Marital status:      Spouse name: Not on file    Number of children: Not on file    Years of education: Not on file    Highest education level: 12th grade   Occupational History    Not on file   Tobacco Use    Smoking status: Never     Passive exposure: Never    Smokeless tobacco: Not on file   Vaping Use    Vaping status: Never Used   Substance and Sexual Activity    Alcohol use: Yes     Comment: occ    Drug use: No    Sexual activity: Not Currently     Partners: Male     Birth control/protection: None     Comment:  currently in different state   Other Topics Concern    Not on file   Social History Narrative    Not on file     Social Drivers of Health     Financial Resource Strain: Low Risk  (2024)    Overall Financial Resource Strain (CARDIA)     Difficulty of Paying Living Expenses: Not very hard   Food Insecurity: No Food Insecurity (2024)    Hunger  Vital Sign     Worried About Running Out of Food in the Last Year: Never true     Ran Out of Food in the Last Year: Never true   Transportation Needs: No Transportation Needs (12/8/2024)    PRAPARE - Transportation     Lack of Transportation (Medical): No     Lack of Transportation (Non-Medical): No   Physical Activity: Sufficiently Active (12/8/2024)    Exercise Vital Sign     Days of Exercise per Week: 7 days     Minutes of Exercise per Session: 40 min   Stress: Stress Concern Present (12/8/2024)    [employer] [place] [employer] - Occupational Stress Questionnaire     Feeling of Stress : To some extent   Social Connections: Moderately Isolated (12/8/2024)    Social Connection and Isolation Panel [NHANES]     Frequency of Communication with Friends and Family: Twice a week     Frequency of Social Gatherings with Friends and Family: Once a week     Attends Voodoo Services: Never     Active Member of Clubs or Organizations: No     Attends Club or Organization Meetings: Never     Marital Status:    Intimate Partner Violence: Not on file   Housing Stability: Low Risk  (12/8/2024)    Housing Stability Vital Sign     Unable to Pay for Housing in the Last Year: No     Number of Times Moved in the Last Year: 0     Homeless in the Last Year: No       Patient Active Problem List    Diagnosis Date Noted    Restless leg syndrome 06/07/2024    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 OTC      MAGNESIUM PO Take 1 Tab by mouth every bedtime. OTC      TURMERIC PO Take 1 Tab by mouth every day.       No current facility-administered medications for this visit.     No Known Allergies      Review of Systems   Constitutional: Negative for fever, chills and malaise/fatigue.   HENT: Negative for congestion.    Eyes: Negative for blurred  "vision.  Respiratory: Negative for cough and shortness of breath.  Cardiovascular: Negative for leg swelling.   Gastrointestinal: Negative for nausea, vomiting, abdominal pain and diarrhea.   Genitourinary: Negative for dysuria and hematuria.   Skin: Negative for rash.   Neurological: Negative for dizziness, focal weakness and headaches.   Endo/Heme/Allergies: Does not bleed easily.   Psychiatric/Behavioral: Negative for depression.  The patient is not nervous/anxious.        Objective:     BP 98/62 (BP Location: Left arm, Patient Position: Sitting, BP Cuff Size: Adult)   Pulse 87   Temp 36.8 °C (98.3 °F) (Temporal)   Ht 1.753 m (5' 9\")   Wt 66.2 kg (146 lb)   SpO2 98%   BMI 21.56 kg/m²   Body mass index is 21.56 kg/m².  Wt Readings from Last 4 Encounters:   12/11/24 66.2 kg (146 lb)   06/19/24 64.4 kg (142 lb)   06/07/24 64.4 kg (142 lb)   05/02/24 64.9 kg (143 lb)       Physical Exam:  Constitutional: Well-developed and well-nourished. Not diaphoretic. No distress.   Skin: Skin is warm and dry. No rash noted.  Head: Atraumatic without lesions.  Eyes: Conjunctivae and extraocular motions are normal. Pupils are equal, round, and reactive to light. No scleral icterus.   Ears:  External ears unremarkable. Tympanic membranes clear and intact.  Nose: Nares patent. No discharge.   Mouth/Throat: Dentition is good. Oropharynx is clear and moist. Posterior pharynx without erythema or exudates.  Neck: Supple, trachea midline. Normal range of motion. No thyromegaly present. No lymphadenopathy.  Cardiovascular: Regular rate and rhythm, no murmur, rubs, or gallops.  Lungs: Normal inspiratory effort, CTA bilaterally, no wheezes/rhonchi/rales  Abdomen: Soft, non tender, and without distention. No rebound, guarding, masses or HSM.  Extremities: No cyanosis, clubbing, erythema, nor edema. Distal pulses intact and symmetric.   Musculoskeletal: All major joints AROM full in all directions without pain.  Neurological: Alert and " oriented x 3. No cranial nerve deficit. Normal gait.  Psychiatric:  Behavior, mood, and affect are appropriate.        Assessment and Plan:     1. Wellness examination        2. Encounter for screening mammogram for malignant neoplasm of breast  MA-SCREENING MAMMO BILAT W/TOMOSYNTHESIS W/CAD      3. Skin cancer screening  Referral to Dermatology          - Labs per orders.  - Immunizations per orders.     Anticipatory guidance:  Discussed oral hygiene and dental home.  Discussed healthy nutrition.  Encouraged regular physical activity, including cardio and weights.  Encouraged 8 hours of sleep at night.  Discussed sun protection (clothing and sunscreen).      Due for PAP, she prefers not to do this today, will schedule this in 2-3 months.        Follow-up: Return in about 3 months (around 3/11/2025) for PAP.

## 2024-12-16 ENCOUNTER — APPOINTMENT (OUTPATIENT)
Dept: PHYSICAL THERAPY | Facility: REHABILITATION | Age: 44
End: 2024-12-16
Attending: PHYSICIAN ASSISTANT
Payer: COMMERCIAL

## 2024-12-18 ENCOUNTER — APPOINTMENT (OUTPATIENT)
Dept: PHYSICAL THERAPY | Facility: REHABILITATION | Age: 44
End: 2024-12-18
Attending: PHYSICIAN ASSISTANT
Payer: COMMERCIAL

## 2024-12-23 ENCOUNTER — APPOINTMENT (OUTPATIENT)
Dept: PHYSICAL THERAPY | Facility: REHABILITATION | Age: 44
End: 2024-12-23
Attending: PHYSICIAN ASSISTANT
Payer: COMMERCIAL

## 2024-12-27 ENCOUNTER — APPOINTMENT (OUTPATIENT)
Dept: PHYSICAL THERAPY | Facility: REHABILITATION | Age: 44
End: 2024-12-27
Attending: PHYSICIAN ASSISTANT
Payer: COMMERCIAL

## 2024-12-30 ENCOUNTER — APPOINTMENT (OUTPATIENT)
Dept: PHYSICAL THERAPY | Facility: REHABILITATION | Age: 44
End: 2024-12-30
Attending: PHYSICIAN ASSISTANT
Payer: COMMERCIAL

## 2025-01-03 ENCOUNTER — APPOINTMENT (OUTPATIENT)
Dept: PHYSICAL THERAPY | Facility: REHABILITATION | Age: 45
End: 2025-01-03
Attending: PHYSICIAN ASSISTANT
Payer: COMMERCIAL

## 2025-03-24 ENCOUNTER — HOSPITAL ENCOUNTER (OUTPATIENT)
Dept: LAB | Facility: MEDICAL CENTER | Age: 45
End: 2025-03-24
Attending: STUDENT IN AN ORGANIZED HEALTH CARE EDUCATION/TRAINING PROGRAM
Payer: COMMERCIAL

## 2025-03-24 DIAGNOSIS — Z11.4 SCREENING FOR HIV (HUMAN IMMUNODEFICIENCY VIRUS): ICD-10-CM

## 2025-03-24 DIAGNOSIS — Z00.00 PREVENTATIVE HEALTH CARE: ICD-10-CM

## 2025-03-24 DIAGNOSIS — Z11.59 NEED FOR HEPATITIS C SCREENING TEST: ICD-10-CM

## 2025-03-24 LAB
ALBUMIN SERPL BCP-MCNC: 4.1 G/DL (ref 3.2–4.9)
ALBUMIN/GLOB SERPL: 1.7 G/DL
ALP SERPL-CCNC: 51 U/L (ref 30–99)
ALT SERPL-CCNC: 16 U/L (ref 2–50)
ANION GAP SERPL CALC-SCNC: 10 MMOL/L (ref 7–16)
AST SERPL-CCNC: 22 U/L (ref 12–45)
BILIRUB SERPL-MCNC: 0.7 MG/DL (ref 0.1–1.5)
BUN SERPL-MCNC: 11 MG/DL (ref 8–22)
CALCIUM ALBUM COR SERPL-MCNC: 9 MG/DL (ref 8.5–10.5)
CALCIUM SERPL-MCNC: 9.1 MG/DL (ref 8.5–10.5)
CHLORIDE SERPL-SCNC: 107 MMOL/L (ref 96–112)
CHOLEST SERPL-MCNC: 158 MG/DL (ref 100–199)
CO2 SERPL-SCNC: 24 MMOL/L (ref 20–33)
CREAT SERPL-MCNC: 0.81 MG/DL (ref 0.5–1.4)
EST. AVERAGE GLUCOSE BLD GHB EST-MCNC: 108 MG/DL
GFR SERPLBLD CREATININE-BSD FMLA CKD-EPI: 90 ML/MIN/1.73 M 2
GLOBULIN SER CALC-MCNC: 2.4 G/DL (ref 1.9–3.5)
GLUCOSE SERPL-MCNC: 86 MG/DL (ref 65–99)
HBA1C MFR BLD: 5.4 % (ref 4–5.6)
HCV AB SER QL: NORMAL
HDLC SERPL-MCNC: 83 MG/DL
HIV 1+2 AB+HIV1 P24 AG SERPL QL IA: NORMAL
LDLC SERPL CALC-MCNC: 68 MG/DL
POTASSIUM SERPL-SCNC: 4.2 MMOL/L (ref 3.6–5.5)
PROT SERPL-MCNC: 6.5 G/DL (ref 6–8.2)
SODIUM SERPL-SCNC: 141 MMOL/L (ref 135–145)
TRIGL SERPL-MCNC: 35 MG/DL (ref 0–149)

## 2025-03-24 PROCEDURE — 80061 LIPID PANEL: CPT

## 2025-03-24 PROCEDURE — 36415 COLL VENOUS BLD VENIPUNCTURE: CPT

## 2025-03-24 PROCEDURE — 87389 HIV-1 AG W/HIV-1&-2 AB AG IA: CPT

## 2025-03-24 PROCEDURE — 86803 HEPATITIS C AB TEST: CPT

## 2025-03-24 PROCEDURE — 83036 HEMOGLOBIN GLYCOSYLATED A1C: CPT

## 2025-03-24 PROCEDURE — 80053 COMPREHEN METABOLIC PANEL: CPT

## 2025-03-25 ENCOUNTER — RESULTS FOLLOW-UP (OUTPATIENT)
Dept: MEDICAL GROUP | Facility: PHYSICIAN GROUP | Age: 45
End: 2025-03-25

## 2025-05-01 ENCOUNTER — APPOINTMENT (OUTPATIENT)
Dept: MEDICAL GROUP | Facility: PHYSICIAN GROUP | Age: 45
End: 2025-05-01
Payer: COMMERCIAL

## 2025-05-01 VITALS
OXYGEN SATURATION: 96 % | DIASTOLIC BLOOD PRESSURE: 58 MMHG | HEART RATE: 76 BPM | TEMPERATURE: 98.3 F | HEIGHT: 69 IN | SYSTOLIC BLOOD PRESSURE: 100 MMHG | BODY MASS INDEX: 22.01 KG/M2 | WEIGHT: 148.6 LBS | RESPIRATION RATE: 18 BRPM

## 2025-05-01 DIAGNOSIS — G43.109 MIGRAINE WITH AURA AND WITHOUT STATUS MIGRAINOSUS, NOT INTRACTABLE: ICD-10-CM

## 2025-05-01 DIAGNOSIS — N94.6 DYSMENORRHEA: ICD-10-CM

## 2025-05-01 PROCEDURE — 3078F DIAST BP <80 MM HG: CPT | Performed by: STUDENT IN AN ORGANIZED HEALTH CARE EDUCATION/TRAINING PROGRAM

## 2025-05-01 PROCEDURE — 99214 OFFICE O/P EST MOD 30 MIN: CPT | Performed by: STUDENT IN AN ORGANIZED HEALTH CARE EDUCATION/TRAINING PROGRAM

## 2025-05-01 PROCEDURE — 3074F SYST BP LT 130 MM HG: CPT | Performed by: STUDENT IN AN ORGANIZED HEALTH CARE EDUCATION/TRAINING PROGRAM

## 2025-05-01 PROCEDURE — 1126F AMNT PAIN NOTED NONE PRSNT: CPT | Performed by: STUDENT IN AN ORGANIZED HEALTH CARE EDUCATION/TRAINING PROGRAM

## 2025-05-01 RX ORDER — UBROGEPANT 50 MG/1
50 TABLET ORAL
Qty: 10 TABLET | Refills: 2 | Status: SHIPPED | OUTPATIENT
Start: 2025-05-01

## 2025-05-01 ASSESSMENT — ENCOUNTER SYMPTOMS
SHORTNESS OF BREATH: 0
CHILLS: 0
HEADACHES: 0
DIZZINESS: 0
FEVER: 0

## 2025-05-01 ASSESSMENT — PATIENT HEALTH QUESTIONNAIRE - PHQ9: CLINICAL INTERPRETATION OF PHQ2 SCORE: 0

## 2025-05-01 ASSESSMENT — PAIN SCALES - GENERAL: PAINLEVEL_OUTOF10: NO PAIN

## 2025-05-01 NOTE — PROGRESS NOTES
Verbal consent was acquired by the patient to use [employer] ambient listening note generation during this visit.    Subjective:     HPI:   History of Present Illness  The patient presents for evaluation of migraines and irregular menstrual cycles.    Migraines  - Chronic history  - Migraines have increased from every 6 months to monthly over the past 3-4 months, with the last episode 6 weeks ago.  - Historically migraines have been benefited by Botox appointments.  - She does experience an aura characterized by blurred vision, tingling in both hands, and dysarthria. Followed by pounding headache.  - Treats with ibuprofen, mild relief.  - Typically migraines last for 3-4 hours but can be debilitating.  - No nausea or vomiting.  - Bright lights are a trigger.    Irregular menstrual cycles  - Irregular menstrual cycles for the past 3-4 months  - Concerned about perimenopause  - For the past 2 months, she has had bleeding for more than half the days out of the month, not consecutive  - Previously light flow, now heavier  - Associated symptoms include cramping, bloating, emotional lability, breast tenderness, fatigue, and hot flashes  - Reports no chance of pregnancy.  - History of amenorrhea for 15 years with Depo-Provera.          Past medical, surgical, family, and social history were reviewed and updated.     Medications:    Current Outpatient Medications:     BIOTIN PO, Take  by mouth., Disp: , Rfl:     Cyanocobalamin (VITAMIN B-12 PO), Take  by mouth., Disp: , Rfl:     Collagen-Vitamin C-Biotin (COLLAGEN PO), Take  by mouth., Disp: , Rfl:     Ubrogepant (UBRELVY) 50 MG Tab, Take 50 mg by mouth one time as needed (migraine) for up to 1 dose., Disp: 10 Tablet, Rfl: 2    POTASSIUM PO, Take 1 Tab by mouth every bedtime. OTC, Disp: , Rfl:     MAGNESIUM PO, Take 1 Tab by mouth every bedtime. OTC, Disp: , Rfl:     TURMERIC PO, Take 1 Tab by mouth every day., Disp: , Rfl:     Allergies:  Patient has no known  "allergies.      Health Maintenance: Completed    ROS:  Review of Systems   Constitutional:  Negative for chills and fever.   Respiratory:  Negative for shortness of breath.    Cardiovascular:  Negative for chest pain.   Neurological:  Negative for dizziness and headaches.       Objective:     Exam:  /58 (BP Location: Left arm, Patient Position: Sitting, BP Cuff Size: Adult)   Pulse 76   Temp 36.8 °C (98.3 °F) (Temporal)   Resp 18   Ht 1.74 m (5' 8.5\")   Wt 67.4 kg (148 lb 9.6 oz)   SpO2 96%   BMI 22.26 kg/m²  Body mass index is 22.26 kg/m².    Physical Exam  Vitals reviewed.   Constitutional:       General: She is not in acute distress.  Eyes:      Extraocular Movements: Extraocular movements intact.   Cardiovascular:      Rate and Rhythm: Normal rate and regular rhythm.      Heart sounds: No murmur heard.     No friction rub. No gallop.   Pulmonary:      Effort: Pulmonary effort is normal.      Breath sounds: No wheezing, rhonchi or rales.   Abdominal:      General: There is no distension.      Palpations: Abdomen is soft.      Tenderness: There is no abdominal tenderness.   Musculoskeletal:      Cervical back: Neck supple.   Skin:     General: Skin is warm and dry.   Neurological:      Mental Status: She is alert.   Psychiatric:         Mood and Affect: Mood normal.         Results      Assessment & Plan:     1. Migraine with aura and without status migrainosus, not intractable  Ubrogepant (UBRELVY) 50 MG Tab      2. Dysmenorrhea  CBC WITHOUT DIFFERENTIAL    TSH WITH REFLEX TO FT4          Assessment & Plan  Migraine with aura  - Intermittent, last episode 6 weeks ago, occurring approximately monthly  - Aura symptoms suggest possible brainstem aura  - I have prescribed Ubrelvy for acute treatment of migraine headaches  - May continue ibuprofen as needed  - No indication for prophylactic therapy at this time, instances less than once a month  - Avoid any known triggers     Irregular menstrual cycles  - " Heavy and frequent bleeding for 3 months, lasting up to 4 days.  - Possibly due to perimenopause, especially considering associated symptoms  - Consider pelvic ultrasound and TSH to evaluate for other causes of irregular bleeding  - Check CBC with rule out anemia  - Patient declines restarting birth control pills at this time  - Continue to monitor symptoms      Follow-up: 3 months for follow up, due for pap          Please note that this dictation was created using voice recognition software. I have made every reasonable attempt to correct obvious errors, but I expect that there are errors of grammar and possibly content that I did not discover before finalizing the note.

## 2025-05-07 ENCOUNTER — TELEPHONE (OUTPATIENT)
Dept: MEDICAL GROUP | Facility: PHYSICIAN GROUP | Age: 45
End: 2025-05-07
Payer: COMMERCIAL

## 2025-05-07 NOTE — TELEPHONE ENCOUNTER
DOCUMENTATION OF PAR STATUS:    1. Name of Medication & Dose: Ubrelvy 50MG tablets     2. Name of Prescription Coverage Company & phone #: [place]    3. Date Prior Auth Submitted: 05/07/2025    4. What information was given to obtain insurance decision? icd    5. Prior Auth Status? Pending    6. Patient Notified: N\A

## 2025-05-09 PROBLEM — G43.109 MIGRAINE WITH AURA AND WITHOUT STATUS MIGRAINOSUS, NOT INTRACTABLE: Status: ACTIVE | Noted: 2025-05-09

## 2025-08-05 ENCOUNTER — HOSPITAL ENCOUNTER (OUTPATIENT)
Facility: MEDICAL CENTER | Age: 45
End: 2025-08-05
Attending: STUDENT IN AN ORGANIZED HEALTH CARE EDUCATION/TRAINING PROGRAM
Payer: COMMERCIAL

## 2025-08-05 ENCOUNTER — OFFICE VISIT (OUTPATIENT)
Dept: MEDICAL GROUP | Facility: PHYSICIAN GROUP | Age: 45
End: 2025-08-05
Payer: COMMERCIAL

## 2025-08-05 ENCOUNTER — HOSPITAL ENCOUNTER (OUTPATIENT)
Dept: LAB | Facility: MEDICAL CENTER | Age: 45
End: 2025-08-05
Attending: STUDENT IN AN ORGANIZED HEALTH CARE EDUCATION/TRAINING PROGRAM
Payer: COMMERCIAL

## 2025-08-05 VITALS
TEMPERATURE: 97.9 F | HEIGHT: 69 IN | HEART RATE: 74 BPM | OXYGEN SATURATION: 97 % | SYSTOLIC BLOOD PRESSURE: 110 MMHG | WEIGHT: 145 LBS | DIASTOLIC BLOOD PRESSURE: 68 MMHG | BODY MASS INDEX: 21.48 KG/M2

## 2025-08-05 DIAGNOSIS — Z11.51 SCREENING FOR HUMAN PAPILLOMAVIRUS (HPV): ICD-10-CM

## 2025-08-05 DIAGNOSIS — Z12.4 SCREENING FOR CERVICAL CANCER: ICD-10-CM

## 2025-08-05 DIAGNOSIS — M25.472 LEFT ANKLE SWELLING: ICD-10-CM

## 2025-08-05 DIAGNOSIS — N94.6 DYSMENORRHEA: ICD-10-CM

## 2025-08-05 DIAGNOSIS — G43.109 MIGRAINE WITH AURA AND WITHOUT STATUS MIGRAINOSUS, NOT INTRACTABLE: ICD-10-CM

## 2025-08-05 DIAGNOSIS — Z01.419 ENCOUNTER FOR WELL WOMAN EXAM: ICD-10-CM

## 2025-08-05 LAB
ERYTHROCYTE [DISTWIDTH] IN BLOOD BY AUTOMATED COUNT: 42.7 FL (ref 35.9–50)
HCT VFR BLD AUTO: 42.7 % (ref 37–47)
HGB BLD-MCNC: 13.6 G/DL (ref 12–16)
MCH RBC QN AUTO: 29 PG (ref 27–33)
MCHC RBC AUTO-ENTMCNC: 31.9 G/DL (ref 32.2–35.5)
MCV RBC AUTO: 91 FL (ref 81.4–97.8)
PLATELET # BLD AUTO: 260 K/UL (ref 164–446)
PMV BLD AUTO: 10.6 FL (ref 9–12.9)
RBC # BLD AUTO: 4.69 M/UL (ref 4.2–5.4)
TSH SERPL DL<=0.005 MIU/L-ACNC: 0.98 UIU/ML (ref 0.38–5.33)
WBC # BLD AUTO: 4.7 K/UL (ref 4.8–10.8)

## 2025-08-05 PROCEDURE — 3074F SYST BP LT 130 MM HG: CPT | Performed by: STUDENT IN AN ORGANIZED HEALTH CARE EDUCATION/TRAINING PROGRAM

## 2025-08-05 PROCEDURE — 84443 ASSAY THYROID STIM HORMONE: CPT

## 2025-08-05 PROCEDURE — 85027 COMPLETE CBC AUTOMATED: CPT

## 2025-08-05 PROCEDURE — 99396 PREV VISIT EST AGE 40-64: CPT | Performed by: STUDENT IN AN ORGANIZED HEALTH CARE EDUCATION/TRAINING PROGRAM

## 2025-08-05 PROCEDURE — 87624 HPV HI-RISK TYP POOLED RSLT: CPT

## 2025-08-05 PROCEDURE — 36415 COLL VENOUS BLD VENIPUNCTURE: CPT

## 2025-08-05 PROCEDURE — 3078F DIAST BP <80 MM HG: CPT | Performed by: STUDENT IN AN ORGANIZED HEALTH CARE EDUCATION/TRAINING PROGRAM

## 2025-08-05 PROCEDURE — 88175 CYTOPATH C/V AUTO FLUID REDO: CPT

## 2025-08-05 RX ORDER — HYDROCHLOROTHIAZIDE 12.5 MG/1
12.5 CAPSULE ORAL DAILY
Qty: 30 CAPSULE | Refills: 0 | Status: SHIPPED | OUTPATIENT
Start: 2025-08-05

## 2025-08-09 LAB
HPV I/H RISK 1 DNA SPEC QL NAA+PROBE: NOT DETECTED
SPECIMEN SOURCE: NORMAL
THINPREP PAP, CYTOLOGY NL11781: NORMAL